# Patient Record
Sex: MALE | Race: WHITE | NOT HISPANIC OR LATINO | Employment: PART TIME | URBAN - METROPOLITAN AREA
[De-identification: names, ages, dates, MRNs, and addresses within clinical notes are randomized per-mention and may not be internally consistent; named-entity substitution may affect disease eponyms.]

---

## 2018-03-19 ENCOUNTER — OFFICE VISIT (OUTPATIENT)
Dept: URGENT CARE | Facility: RETAIL CLINIC | Age: 18
End: 2018-03-19
Payer: COMMERCIAL

## 2018-03-19 VITALS
TEMPERATURE: 98.4 F | SYSTOLIC BLOOD PRESSURE: 102 MMHG | DIASTOLIC BLOOD PRESSURE: 57 MMHG | HEART RATE: 52 BPM | OXYGEN SATURATION: 96 %

## 2018-03-19 DIAGNOSIS — H61.23 BILATERAL IMPACTED CERUMEN: Primary | ICD-10-CM

## 2018-03-19 DIAGNOSIS — H93.8X3 EAR FULLNESS, BILATERAL: ICD-10-CM

## 2018-03-19 PROCEDURE — 69209 REMOVE IMPACTED EAR WAX UNI: CPT | Mod: 50 | Performed by: NURSE PRACTITIONER

## 2018-03-19 PROCEDURE — 99213 OFFICE O/P EST LOW 20 MIN: CPT | Mod: 25 | Performed by: NURSE PRACTITIONER

## 2018-03-19 NOTE — MR AVS SNAPSHOT
"              After Visit Summary   3/19/2018    Jaret Montoya    MRN: [id number]           Patient Information     Date Of Birth          2000        Visit Information        Provider Department      3/19/2018 10:10 AM Guzman Burnette APRN [facility]        Today's Diagnoses     Bilateral impacted cerumen    -  1    Ear fullness, bilateral           Follow-ups after your visit        Who to contact     You can reach your care team any time of the day by calling [phone].  Notification of test results:  If you have an abnormal lab result, we will notify you by phone as soon as possible.         Additional Information About Your Visit        MyChart Information     [employer] lets you send messages to your doctor, view your test results, renew your prescriptions, schedule appointments and more. To sign up, go to [email or web address] . Click on \"Log in\" on the left side of the screen, which will take you to the Welcome page. Then click on \"Sign up Now\" on the right side of the page.     You will be asked to enter the access code listed below, as well as some personal information. Please follow the directions to create your username and password.     Your access code is: [email or web address]  Expires: 2018 11:12 AM     Your access code will  in 90 days. If you need help or a new code, please call your [facility] clinic or [phone].        Care EveryWhere ID     This is your [facility] EveryWhere ID. This could be used by other organizations to access your [facility] medical records  [id number]        Your Vitals Were     Pulse Temperature Pulse Oximetry             52 98.4  F (36.9  C) (Oral) 96%          Blood Pressure from Last 3 Encounters:   18 102/57   03/10/15 110/64   09/15/14 104/64    Weight from Last 3 Encounters:   11/17/15 124 lb (56.2 kg) (13 %)*   03/10/15 116 lb 3.2 oz (52.7 kg) (9 %)*   09/15/14 112 lb 9.6 oz (51.1 kg) (9 %)*     * Growth percentiles are based " on CDC 2-20 Years data.              We Performed the Following     HC REMOVAL IMPACTED CERUMEN IRRIGATION/LVG UNILAT        Primary Care Provider Office Phone # Fax #    Henri Saldivar PA-C [phone]       [place] [address]        Equal Access to Services     JABARIFIDELINA JANETH : Hadii aad ku hadasho Soomaali, waaxda luqadaha, qaybta kaalmada adeegyada, waxay idiin hayaan adeeg kharash la'aan . So [facility] [phone].    ATENCIÓN: Si habla español, tiene a manriquez disposición servicios gratuitos de asistencia lingüística. Llame al [phone].    We comply with applicable federal civil rights laws and [place] laws. We do not discriminate on the basis of race, color, national origin, age, disability, sex, sexual orientation, or gender identity.            Thank you!     Thank you for choosing [facility]  for your care. Our goal is always to provide you with excellent care. Hearing back from our patients is one way we can continue to improve our services. Please take a few minutes to complete the written survey that you may receive in the mail after your visit with us. Thank you!             Your Updated Medication List - Protect others around you: Learn how to safely use, store and throw away your medicines at www.disposemymeds.org.      Notice  As of 3/19/2018 11:12 AM    You have not been prescribed any medications.

## 2018-03-19 NOTE — NURSING NOTE
"Chief Complaint   Patient presents with     Ear Problem     both ears x a couple of weeks       Initial /57  Pulse 52  Temp 98.4  F (36.9  C) (Oral)  SpO2 96% Estimated body mass index is 19.71 kg/(m^2) as calculated from the following:    Height as of 3/10/15: 5' 6.5\" (1.689 m).    Weight as of 11/17/15: 124 lb (56.2 kg).  Medication Reconciliation: complete   Zaida Reynolds      "

## 2018-03-19 NOTE — PROGRESS NOTES
SUBJECTIVE:  Jaret Montoya is a 18 year old male who presents with bilateral ear fullness and pressure for 2 week(s).   Severity: mild and moderate   Timing:still present  Additional symptoms include nasal congestion, cough, ear pain, fever, rhinorrhea, sore throat and post nasal drip.      History of recurrent otitis: yes    Past Medical History:   Diagnosis Date     ALLERGIC RHINITIS NOS 7/21/2007     ASTHMA - MILD INTERMITTENT 7/21/2007     ATTN DEFICIT W HYPERACT 6/21/2007     Head contusion 1/7/2009     Impacted cerumen 5/21/2014     Plantar warts 8/27/2009     No current outpatient prescriptions on file.     History   Smoking Status     Never Smoker   Smokeless Tobacco     Never Used     Comment: father smokes in house     ROS:   Review of systems negative except as stated above.    OBJECTIVE:  /57  Pulse 52  Temp 98.4  F (36.9  C) (Oral)  SpO2 96%  The right TM is normal: no effusions, no erythema, and normal landmarks     The right auditory canal is obstructed with cerumen  The left TM is normal: no effusions, no erythema, and normal landmarks  The left auditory canal is obstructed with cerumen  Oropharynx exam is normal: no lesions, erythema, adenopathy or exudate.  GENERAL: alert and mild distress  EYES: EOMI,  PERRL, conjunctiva clear  NECK: supple, non-tender to palpation, no adenopathy noted  RESP: lungs clear to auscultation - no rales, rhonchi or wheezes  CV: regular rates and rhythm, normal S1 S2, no murmur noted  ABDOMEN: soft, normal bowel sounds  SKIN: no suspicious lesions or rashes     ASSESSMENT:   Ear fullness, bilateral  Bilateral impacted cerumen      PLAN:  CPT code 51230  MA Flushed both ears with warm water/hydrogen peroxide mix, impacted wax removed,  after ear lavage no remainder of cerumen left in ear canals clear.    Discussed care & treatment of cerumen impaction.  Reviewed different OTC/Home care options available (oil drops, candeling, Debrox, etc.)  Advised Jaret VELÁSQUEZ  Jan to f/u with PCP if persistent problems with ears or hearing.    Guzman Burnette MSN, APRN, Family NP-C  [facility]

## 2020-07-25 ENCOUNTER — HOSPITAL ENCOUNTER (EMERGENCY)
Facility: CLINIC | Age: 20
Discharge: HOME | End: 2020-07-25
Attending: FAMILY MEDICINE | Admitting: FAMILY MEDICINE
Payer: COMMERCIAL

## 2020-07-25 VITALS
TEMPERATURE: 98.2 F | OXYGEN SATURATION: 98 % | RESPIRATION RATE: 16 BRPM | BODY MASS INDEX: 26.38 KG/M2 | DIASTOLIC BLOOD PRESSURE: 68 MMHG | SYSTOLIC BLOOD PRESSURE: 141 MMHG | HEART RATE: 85 BPM | WEIGHT: 165.9 LBS

## 2020-07-25 DIAGNOSIS — K04.7 DENTAL ABSCESS: ICD-10-CM

## 2020-07-25 PROCEDURE — 99283 EMERGENCY DEPT VISIT LOW MDM: CPT | Performed by: FAMILY MEDICINE

## 2020-07-25 PROCEDURE — 99283 EMERGENCY DEPT VISIT LOW MDM: CPT | Mod: Z6 | Performed by: FAMILY MEDICINE

## 2020-07-25 RX ORDER — CLINDAMYCIN HCL 300 MG
300 CAPSULE ORAL 4 TIMES DAILY
Qty: 40 CAPSULE | Refills: 0 | Status: SHIPPED | OUTPATIENT
Start: 2020-07-25 | End: 2020-08-04

## 2020-07-25 NOTE — ED TRIAGE NOTES
Patient has abscessed areas to top and bottom gums on the right side of mouth. Scheduled to have teeth pulled in about a month.

## 2020-07-25 NOTE — ED PROVIDER NOTES
History     Chief Complaint   Patient presents with     Dental Pain     HPI  Jaret Montoya is a 20 year old male who presents with swelling Around his upper and lower teeth area.  He just noticed this yesterday.  Patient has been seen the dentist and is set up to have his teeth removed but not for another month.  He just finished a course of antibiotics about 2 days ago, it was amoxicillin.  Patient states since yesterday he has had some increasing pain in his mouth.  It hurts when he chews.  Denies any fevers or chills.  There is been no nausea, vomiting or diarrhea.  Pain is described as aching, nothing makes it better or worse.    Allergies:  Allergies   Allergen Reactions     Azithromycin Rash     Zithromax     Dust Mites        Problem List:    Patient Active Problem List    Diagnosis Date Noted     Impacted cerumen 05/21/2014     Priority: Medium     History of impacted cerumen 04/30/2014     Priority: Medium     Plantar warts 08/27/2009     Priority: Medium     Head contusion 01/07/2009     Priority: Medium     Allergic rhinitis 07/21/2007     Priority: Medium     Problem list name updated by automated process. Provider to review       Mild intermittent asthma 07/21/2007     Priority: Medium     Attention deficit hyperactivity disorder (ADHD) 06/21/2007     Priority: Medium     Problem list name updated by automated process. Provider to review          Past Medical History:    Past Medical History:   Diagnosis Date     ALLERGIC RHINITIS NOS 7/21/2007     ASTHMA - MILD INTERMITTENT 7/21/2007     ATTN DEFICIT W HYPERACT 6/21/2007     Head contusion 1/7/2009     Impacted cerumen 5/21/2014     Plantar warts 8/27/2009       Past Surgical History:    No past surgical history on file.    Family History:    No family history on file.    Social History:  Marital Status:  Single [1]  Social History     Tobacco Use     Smoking status: Never Smoker     Smokeless tobacco: Never Used     Tobacco comment: father smokes  in house   Substance Use Topics     Alcohol use: No     Alcohol/week: 0.0 standard drinks     Drug use: No        Medications:    No current outpatient medications on file.        Review of Systems   All other systems reviewed and are negative.      Physical Exam   BP: (!) 141/68  Pulse: 85  Temp: 98.2  F (36.8  C)  Resp: 16  Weight: 75.3 kg (165 lb 14.4 oz)  SpO2: 98 %      Physical Exam  Vitals signs and nursing note reviewed.   Constitutional:       General: He is not in acute distress.     Appearance: Normal appearance. He is not ill-appearing.   HENT:      Mouth/Throat:      Dentition: Abnormal dentition. Gingival swelling, dental caries and dental abscesses present. No dental tenderness.     Neurological:      Mental Status: He is alert.         ED Course        Procedures               No results found for this or any previous visit (from the past 24 hour(s)).    Medications - No data to display     6 exam is consistent with a dental abscess that is developing.  Recommend patient to follow-up with a dentist as soon as possible.  We will start the patient on another round of antibiotics, I will choose clindamycin to see if this works better.  I strongly though suggested the patient see his dentist as soon as possible.  Patient discharged home.    Assessments & Plan (with Medical Decision Making)  Dental abscess     I have reviewed the nursing notes.    I have reviewed the findings, diagnosis, plan and need for follow up with the patient.              7/25/2020   [facility] EMERGENCY DEPARTMENT     Melvin Crawford MD  07/25/20 [id number]

## 2020-07-25 NOTE — ED AVS SNAPSHOT
[facility] Emergency Department  [address] [place] [address]  Phone:  [phone]  Fax:  [phone]                                    Jaret Montoya   MRN: [id number]    Department:  [facility] Emergency Department   Date of Visit:  7/25/2020           After Visit Summary Signature Page    I have received my discharge instructions, and my questions have been answered. I have discussed any challenges I see with this plan with the nurse or doctor.    ..........................................................................................................................................  Patient/Patient Representative Signature      ..........................................................................................................................................  Patient Representative Print Name and Relationship to Patient    ..................................................               ................................................  Date                                   Time    ..........................................................................................................................................  Reviewed by Signature/Title    ...................................................              ..............................................  Date                                               Time          22EPIC Rev 08/18

## 2022-09-21 ENCOUNTER — HOSPITAL ENCOUNTER (EMERGENCY)
Facility: CLINIC | Age: 22
Discharge: HOME | End: 2022-09-21
Attending: FAMILY MEDICINE | Admitting: FAMILY MEDICINE
Payer: COMMERCIAL

## 2022-09-21 VITALS
SYSTOLIC BLOOD PRESSURE: 134 MMHG | WEIGHT: 176.5 LBS | DIASTOLIC BLOOD PRESSURE: 70 MMHG | OXYGEN SATURATION: 97 % | RESPIRATION RATE: 23 BRPM | HEART RATE: 111 BPM | BODY MASS INDEX: 28.06 KG/M2 | TEMPERATURE: 99 F

## 2022-09-21 DIAGNOSIS — U07.1 INFECTION DUE TO 2019 NOVEL CORONAVIRUS: ICD-10-CM

## 2022-09-21 LAB
ALBUMIN SERPL-MCNC: 4.8 G/DL (ref 3.4–5)
ALBUMIN UR-MCNC: ABNORMAL MG/DL
ALP SERPL-CCNC: 62 U/L (ref 40–150)
ALT SERPL W P-5'-P-CCNC: 53 U/L (ref 0–70)
ANION GAP SERPL CALCULATED.3IONS-SCNC: 7 MMOL/L (ref 3–14)
APPEARANCE UR: CLEAR
AST SERPL W P-5'-P-CCNC: 23 U/L (ref 0–45)
BASOPHILS # BLD AUTO: 0 10E3/UL (ref 0–0.2)
BASOPHILS NFR BLD AUTO: 0 %
BILIRUB SERPL-MCNC: 0.8 MG/DL (ref 0.2–1.3)
BILIRUB UR QL STRIP: NEGATIVE
BUN SERPL-MCNC: 11 MG/DL (ref 7–30)
CALCIUM SERPL-MCNC: 9.4 MG/DL (ref 8.5–10.1)
CHLORIDE BLD-SCNC: 104 MMOL/L (ref 94–109)
CO2 SERPL-SCNC: 28 MMOL/L (ref 20–32)
COLOR UR AUTO: YELLOW
CREAT SERPL-MCNC: 0.93 MG/DL (ref 0.66–1.25)
EOSINOPHIL # BLD AUTO: 0.3 10E3/UL (ref 0–0.7)
EOSINOPHIL NFR BLD AUTO: 5 %
ERYTHROCYTE [DISTWIDTH] IN BLOOD BY AUTOMATED COUNT: 11.7 % (ref 10–15)
FLUAV RNA SPEC QL NAA+PROBE: NEGATIVE
FLUBV RNA RESP QL NAA+PROBE: NEGATIVE
GFR SERPL CREATININE-BSD FRML MDRD: >90 ML/MIN/1.73M2
GLUCOSE BLD-MCNC: 96 MG/DL (ref 70–99)
GLUCOSE UR STRIP-MCNC: NEGATIVE MG/DL
HCT VFR BLD AUTO: 44.4 % (ref 40–53)
HGB BLD-MCNC: 16.1 G/DL (ref 13.3–17.7)
HGB UR QL STRIP: NEGATIVE
IMM GRANULOCYTES # BLD: 0 10E3/UL
IMM GRANULOCYTES NFR BLD: 0 %
KETONES UR STRIP-MCNC: 5 MG/DL
LACTATE SERPL-SCNC: 1.2 MMOL/L (ref 0.7–2)
LEUKOCYTE ESTERASE UR QL STRIP: NEGATIVE
LIPASE SERPL-CCNC: 154 U/L (ref 73–393)
LYMPHOCYTES # BLD AUTO: 0.5 10E3/UL (ref 0.8–5.3)
LYMPHOCYTES NFR BLD AUTO: 7 %
MCH RBC QN AUTO: 31.6 PG (ref 26.5–33)
MCHC RBC AUTO-ENTMCNC: 36.3 G/DL (ref 31.5–36.5)
MCV RBC AUTO: 87 FL (ref 78–100)
MONOCYTES # BLD AUTO: 0.8 10E3/UL (ref 0–1.3)
MONOCYTES NFR BLD AUTO: 11 %
NEUTROPHILS # BLD AUTO: 5.3 10E3/UL (ref 1.6–8.3)
NEUTROPHILS NFR BLD AUTO: 77 %
NITRATE UR QL: NEGATIVE
NRBC # BLD AUTO: 0 10E3/UL
NRBC BLD AUTO-RTO: 0 /100
PH UR STRIP: 7.5 [PH] (ref 5–7)
PLATELET # BLD AUTO: 210 10E3/UL (ref 150–450)
POTASSIUM BLD-SCNC: 3.3 MMOL/L (ref 3.4–5.3)
PROT SERPL-MCNC: 7.9 G/DL (ref 6.8–8.8)
RBC # BLD AUTO: 5.1 10E6/UL (ref 4.4–5.9)
RBC URINE: 0 /HPF
SARS-COV-2 RNA RESP QL NAA+PROBE: POSITIVE
SODIUM SERPL-SCNC: 139 MMOL/L (ref 133–144)
SP GR UR STRIP: 1 (ref 1–1.03)
UROBILINOGEN UR STRIP-MCNC: NORMAL MG/DL
WBC # BLD AUTO: 6.9 10E3/UL (ref 4–11)
WBC URINE: <1 /HPF

## 2022-09-21 PROCEDURE — 85004 AUTOMATED DIFF WBC COUNT: CPT | Performed by: FAMILY MEDICINE

## 2022-09-21 PROCEDURE — C9803 HOPD COVID-19 SPEC COLLECT: HCPCS | Performed by: FAMILY MEDICINE

## 2022-09-21 PROCEDURE — 83605 ASSAY OF LACTIC ACID: CPT | Performed by: FAMILY MEDICINE

## 2022-09-21 PROCEDURE — 87636 SARSCOV2 & INF A&B AMP PRB: CPT | Performed by: FAMILY MEDICINE

## 2022-09-21 PROCEDURE — 83690 ASSAY OF LIPASE: CPT | Performed by: FAMILY MEDICINE

## 2022-09-21 PROCEDURE — 99284 EMERGENCY DEPT VISIT MOD MDM: CPT | Mod: CS | Performed by: FAMILY MEDICINE

## 2022-09-21 PROCEDURE — 96375 TX/PRO/DX INJ NEW DRUG ADDON: CPT | Performed by: FAMILY MEDICINE

## 2022-09-21 PROCEDURE — 96374 THER/PROPH/DIAG INJ IV PUSH: CPT | Performed by: FAMILY MEDICINE

## 2022-09-21 PROCEDURE — 36415 COLL VENOUS BLD VENIPUNCTURE: CPT | Performed by: FAMILY MEDICINE

## 2022-09-21 PROCEDURE — 80053 COMPREHEN METABOLIC PANEL: CPT | Performed by: FAMILY MEDICINE

## 2022-09-21 PROCEDURE — 250N000013 HC RX MED GY IP 250 OP 250 PS 637: Performed by: FAMILY MEDICINE

## 2022-09-21 PROCEDURE — 258N000003 HC RX IP 258 OP 636: Performed by: FAMILY MEDICINE

## 2022-09-21 PROCEDURE — 99284 EMERGENCY DEPT VISIT MOD MDM: CPT | Mod: CS,25 | Performed by: FAMILY MEDICINE

## 2022-09-21 PROCEDURE — 250N000011 HC RX IP 250 OP 636: Performed by: FAMILY MEDICINE

## 2022-09-21 PROCEDURE — 81001 URINALYSIS AUTO W/SCOPE: CPT | Performed by: FAMILY MEDICINE

## 2022-09-21 PROCEDURE — 96361 HYDRATE IV INFUSION ADD-ON: CPT | Performed by: FAMILY MEDICINE

## 2022-09-21 RX ORDER — ONDANSETRON 2 MG/ML
4 INJECTION INTRAMUSCULAR; INTRAVENOUS EVERY 30 MIN PRN
Status: DISCONTINUED | OUTPATIENT
Start: 2022-09-21 | End: 2022-09-21 | Stop reason: HOSPADM

## 2022-09-21 RX ORDER — ACETAMINOPHEN 325 MG/1
975 TABLET ORAL ONCE
Status: COMPLETED | OUTPATIENT
Start: 2022-09-21 | End: 2022-09-21

## 2022-09-21 RX ORDER — ONDANSETRON 4 MG/1
4 TABLET, ORALLY DISINTEGRATING ORAL EVERY 8 HOURS PRN
Qty: 10 TABLET | Refills: 0 | Status: SHIPPED | OUTPATIENT
Start: 2022-09-21 | End: 2022-09-24

## 2022-09-21 RX ORDER — KETOROLAC TROMETHAMINE 30 MG/ML
30 INJECTION, SOLUTION INTRAMUSCULAR; INTRAVENOUS ONCE
Status: COMPLETED | OUTPATIENT
Start: 2022-09-21 | End: 2022-09-21

## 2022-09-21 RX ORDER — SODIUM CHLORIDE 9 MG/ML
INJECTION, SOLUTION INTRAVENOUS CONTINUOUS
Status: DISCONTINUED | OUTPATIENT
Start: 2022-09-21 | End: 2022-09-21 | Stop reason: HOSPADM

## 2022-09-21 RX ADMIN — ACETAMINOPHEN 975 MG: 325 TABLET, FILM COATED ORAL at 16:46

## 2022-09-21 RX ADMIN — ONDANSETRON 4 MG: 2 INJECTION INTRAMUSCULAR; INTRAVENOUS at 16:05

## 2022-09-21 RX ADMIN — KETOROLAC TROMETHAMINE 30 MG: 30 INJECTION, SOLUTION INTRAMUSCULAR; INTRAVENOUS at 16:05

## 2022-09-21 RX ADMIN — SODIUM CHLORIDE 1000 ML: 9 INJECTION, SOLUTION INTRAVENOUS at 16:04

## 2022-09-21 ASSESSMENT — ACTIVITIES OF DAILY LIVING (ADL): ADLS_ACUITY_SCORE: 35

## 2022-09-21 NOTE — ED PROVIDER NOTES
History     Chief Complaint   Patient presents with     Back Pain     HPI  Jaret Montoya is a 22 year old male who presents with back pain that started a few hours ago.  Patient states that the pain is in his mid back area and does not radiate.  Patient states the pain is constant.  Nothing he can do makes the pain better or worse.  Denies any vomiting but has had a lot of nausea.  Patient had a bowel movement today which was normal.  Denies any dysuria or hematuria.  Patient denies any recent trauma.  Patient was not doing any strenuous activity when the pain started.    Allergies:  Allergies   Allergen Reactions     Azithromycin Rash     Zithromax     Dust Mites        Problem List:    Patient Active Problem List    Diagnosis Date Noted     Impacted cerumen 05/21/2014     Priority: Medium     History of impacted cerumen 04/30/2014     Priority: Medium     Plantar warts 08/27/2009     Priority: Medium     Head contusion 01/07/2009     Priority: Medium     Allergic rhinitis 07/21/2007     Priority: Medium     Problem list name updated by automated process. Provider to review       Mild intermittent asthma 07/21/2007     Priority: Medium     Attention deficit hyperactivity disorder (ADHD) 06/21/2007     Priority: Medium     Problem list name updated by automated process. Provider to review          Past Medical History:    Past Medical History:   Diagnosis Date     ALLERGIC RHINITIS NOS 7/21/2007     ASTHMA - MILD INTERMITTENT 7/21/2007     ATTN DEFICIT W HYPERACT 6/21/2007     Head contusion 1/7/2009     Impacted cerumen 5/21/2014     Plantar warts 8/27/2009       Past Surgical History:    No past surgical history on file.    Family History:    No family history on file.    Social History:  Marital Status:  Single [1]  Social History     Tobacco Use     Smoking status: Never Smoker     Smokeless tobacco: Never Used     Tobacco comment: father smokes in house   Substance Use Topics     Alcohol use: No      Alcohol/week: 0.0 standard drinks     Drug use: No        Medications:    No current outpatient medications on file.        Review of Systems   All other systems reviewed and are negative.      Physical Exam   BP: 134/89  Pulse: (!) 121  Temp: (!) 101.5  F (38.6  C)  Resp: 18  Weight: 80.1 kg (176 lb 8 oz)  SpO2: 100 %      Physical Exam  Vitals and nursing note reviewed.   Constitutional:       General: He is not in acute distress.     Appearance: He is well-developed. He is not diaphoretic.   HENT:      Head: Normocephalic and atraumatic.      Nose: Nose normal.      Mouth/Throat:      Pharynx: No oropharyngeal exudate.   Eyes:      General: No scleral icterus.     Conjunctiva/sclera: Conjunctivae normal.      Pupils: Pupils are equal, round, and reactive to light.   Cardiovascular:      Rate and Rhythm: Normal rate and regular rhythm.      Heart sounds: Normal heart sounds. No murmur heard.    No friction rub.   Pulmonary:      Effort: Pulmonary effort is normal. No respiratory distress.      Breath sounds: Normal breath sounds. No wheezing or rales.   Abdominal:      General: Bowel sounds are normal. There is no distension.      Palpations: Abdomen is soft. There is no mass.      Tenderness: There is no abdominal tenderness. There is no guarding or rebound.   Musculoskeletal:         General: Tenderness (mild mid back tenderness) present. Normal range of motion.      Cervical back: Normal range of motion.   Skin:     General: Skin is warm.      Findings: No rash.   Neurological:      Mental Status: He is alert and oriented to person, place, and time.   Psychiatric:         Judgment: Judgment normal.         ED Course                 Procedures           Results for orders placed or performed during the hospital encounter of 09/21/22 (from the past 24 hour(s))   Comprehensive metabolic panel   Result Value Ref Range    Sodium 139 133 - 144 mmol/L    Potassium 3.3 (L) 3.4 - 5.3 mmol/L    Chloride 104 94 - 109  mmol/L    Carbon Dioxide (CO2) 28 20 - 32 mmol/L    Anion Gap 7 3 - 14 mmol/L    Urea Nitrogen 11 7 - 30 mg/dL    Creatinine 0.93 0.66 - 1.25 mg/dL    Calcium 9.4 8.5 - 10.1 mg/dL    Glucose 96 70 - 99 mg/dL    Alkaline Phosphatase 62 40 - 150 U/L    AST 23 0 - 45 U/L    ALT 53 0 - 70 U/L    Protein Total 7.9 6.8 - 8.8 g/dL    Albumin 4.8 3.4 - 5.0 g/dL    Bilirubin Total 0.8 0.2 - 1.3 mg/dL    GFR Estimate >90 >60 mL/min/1.73m2   CBC with platelets differential    Narrative    The following orders were created for panel order CBC with platelets differential.  Procedure                               Abnormality         Status                     ---------                               -----------         ------                     CBC with platelets and d...[[id number]]  Abnormal            Final result                 Please view results for these tests on the individual orders.   Lipase   Result Value Ref Range    Lipase 154 73 - 393 U/L   Lactic acid whole blood   Result Value Ref Range    Lactic Acid 1.2 0.7 - 2.0 mmol/L   CBC with platelets and differential   Result Value Ref Range    WBC Count 6.9 4.0 - 11.0 10e3/uL    RBC Count 5.10 4.40 - 5.90 10e6/uL    Hemoglobin 16.1 13.3 - 17.7 g/dL    Hematocrit 44.4 40.0 - 53.0 %    MCV 87 78 - 100 fL    MCH 31.6 26.5 - 33.0 pg    MCHC 36.3 31.5 - 36.5 g/dL    RDW 11.7 10.0 - 15.0 %    Platelet Count 210 150 - 450 10e3/uL    % Neutrophils 77 %    % Lymphocytes 7 %    % Monocytes 11 %    % Eosinophils 5 %    % Basophils 0 %    % Immature Granulocytes 0 %    NRBCs per 100 WBC 0 <1 /100    Absolute Neutrophils 5.3 1.6 - 8.3 10e3/uL    Absolute Lymphocytes 0.5 (L) 0.8 - 5.3 10e3/uL    Absolute Monocytes 0.8 0.0 - 1.3 10e3/uL    Absolute Eosinophils 0.3 0.0 - 0.7 10e3/uL    Absolute Basophils 0.0 0.0 - 0.2 10e3/uL    Absolute Immature Granulocytes 0.0 <=0.4 10e3/uL    Absolute NRBCs 0.0 10e3/uL   UA with Microscopic reflex to Culture    Specimen: Urine, Midstream   Result  Value Ref Range    Color Urine Yellow Colorless, Straw, Light Yellow, Yellow    Appearance Urine Clear Clear    Glucose Urine Negative Negative mg/dL    Bilirubin Urine Negative Negative    Ketones Urine 5  (A) Negative mg/dL    Specific Gravity Urine 1.005 1.003 - 1.035    Blood Urine Negative Negative    pH Urine 7.5 (H) 5.0 - 7.0    Protein Albumin Urine Trace (A) Negative mg/dL    Urobilinogen Urine Normal Normal, 2.0 mg/dL    Nitrite Urine Negative Negative    Leukocyte Esterase Urine Negative Negative    RBC Urine 0 <=2 /HPF    WBC Urine <1 <=5 /HPF    Narrative    Urine Culture not indicated   Symptomatic; Auto-generated order Influenza A/B & SARS-CoV2 (COVID-19) Virus PCR Multiplex Nose    Specimen: Nose; Swab   Result Value Ref Range    Influenza A PCR Negative Negative    Influenza B PCR Negative Negative    SARS CoV2 PCR Positive (A) Negative    Narrative    Testing was performed using the carlos SARS-CoV-2 & Influenza A/B Assay on the carlos [employer] System. This test should be ordered for the detection of SARS-CoV-2 and influenza viruses in individuals who meet clinical and/or epidemiological criteria. Test performance is unknown in asymptomatic patients. This test is for in vitro diagnostic use under the FDA EUA for laboratories certified under CLIA to perform moderate and/or high complexity testing. This test has not been FDA cleared or approved. A negative result does not rule out the presence of PCR inhibitors in the specimen or target RNA in concentration below the limit of detection for the assay. If only one viral target is positive but coinfection with multiple targets is suspected, the sample should be re-tested with another FDA cleared, approved or authorized test, if coinfection would change clinical management. [facility] are certified under the Clinical Laboratory Improvement Amendments of 1988 (CLIA-88) as  qualified to perform moderate and/or high complexity laboratory  testing.       Medications   sodium chloride (PF) 0.9% PF flush 3 mL (has no administration in time range)   sodium chloride (PF) 0.9% PF flush 3 mL (has no administration in time range)   0.9% sodium chloride BOLUS (has no administration in time range)     Followed by   sodium chloride 0.9% infusion (has no administration in time range)   ondansetron (ZOFRAN) injection 4 mg (has no administration in time range)   acetaminophen (TYLENOL) tablet 975 mg (has no administration in time range)   ketorolac (TORADOL) injection 30 mg (has no administration in time range)     Labs have come back and patient does not have any signs of sepsis.  Patient's COVID test did come back and was positive.  I think that the back pain is more muscular, I do not suspect any epidural or spinal abscess.  Patient is feeling significantly better now.  We will go ahead and discharge the patient home.  Recommend conservative care.  Patient was given a note to be off work for the next 5 days.  Patient will follow-up if there is no improvement over the next few days.    Assessments & Plan (with Medical Decision Making)  COVID-19 infection     I have reviewed the nursing notes.    I have reviewed the findings, diagnosis, plan and need for follow up with the patient.              9/21/2022   [facility] EMERGENCY DEPT     Melvin Crawford MD  09/21/22 [id number]

## 2022-09-21 NOTE — ED TRIAGE NOTES
He has mid back pain that started this afternoon at work but is not sure of an injury and denies urinary symptoms.

## 2022-09-21 NOTE — Clinical Note
Jaret Montoya was seen and treated in our emergency department on 9/21/2022.  He may return to work on 09/27/2022.  Patient needs to be excused from work for this period of time due to being COVID positive.     If you have any questions or concerns, please don't hesitate to call.      Melvin Crawford MD

## 2022-11-06 ENCOUNTER — HOSPITAL ENCOUNTER (EMERGENCY)
Facility: CLINIC | Age: 22
Discharge: HOME | End: 2022-11-06
Attending: EMERGENCY MEDICINE | Admitting: EMERGENCY MEDICINE
Payer: OTHER MISCELLANEOUS

## 2022-11-06 VITALS
OXYGEN SATURATION: 99 % | BODY MASS INDEX: 27.04 KG/M2 | TEMPERATURE: 98 F | WEIGHT: 178.4 LBS | HEIGHT: 68 IN | HEART RATE: 76 BPM | DIASTOLIC BLOOD PRESSURE: 83 MMHG | RESPIRATION RATE: 20 BRPM | SYSTOLIC BLOOD PRESSURE: 139 MMHG

## 2022-11-06 DIAGNOSIS — S01.81XA LACERATION OF FOREHEAD, INITIAL ENCOUNTER: ICD-10-CM

## 2022-11-06 PROCEDURE — 99284 EMERGENCY DEPT VISIT MOD MDM: CPT | Mod: 25 | Performed by: EMERGENCY MEDICINE

## 2022-11-06 PROCEDURE — 90715 TDAP VACCINE 7 YRS/> IM: CPT | Performed by: EMERGENCY MEDICINE

## 2022-11-06 PROCEDURE — 90471 IMMUNIZATION ADMIN: CPT | Performed by: EMERGENCY MEDICINE

## 2022-11-06 PROCEDURE — 250N000011 HC RX IP 250 OP 636: Performed by: EMERGENCY MEDICINE

## 2022-11-06 PROCEDURE — 12013 RPR F/E/E/N/L/M 2.6-5.0 CM: CPT | Performed by: EMERGENCY MEDICINE

## 2022-11-06 PROCEDURE — 99283 EMERGENCY DEPT VISIT LOW MDM: CPT | Mod: 25 | Performed by: EMERGENCY MEDICINE

## 2022-11-06 RX ORDER — ONDANSETRON 4 MG/1
4 TABLET, ORALLY DISINTEGRATING ORAL ONCE
Status: COMPLETED | OUTPATIENT
Start: 2022-11-06 | End: 2022-11-06

## 2022-11-06 RX ADMIN — CLOSTRIDIUM TETANI TOXOID ANTIGEN (FORMALDEHYDE INACTIVATED), CORYNEBACTERIUM DIPHTHERIAE TOXOID ANTIGEN (FORMALDEHYDE INACTIVATED), BORDETELLA PERTUSSIS TOXOID ANTIGEN (GLUTARALDEHYDE INACTIVATED), BORDETELLA PERTUSSIS FILAMENTOUS HEMAGGLUTININ ANTIGEN (FORMALDEHYDE INACTIVATED), BORDETELLA PERTUSSIS PERTACTIN ANTIGEN, AND BORDETELLA PERTUSSIS FIMBRIAE 2/3 ANTIGEN 0.5 ML: 5; 2; 2.5; 5; 3; 5 INJECTION, SUSPENSION INTRAMUSCULAR at 15:55

## 2022-11-06 RX ADMIN — ONDANSETRON 4 MG: 4 TABLET, ORALLY DISINTEGRATING ORAL at 15:54

## 2022-11-06 ASSESSMENT — ACTIVITIES OF DAILY LIVING (ADL): ADLS_ACUITY_SCORE: 35

## 2022-11-06 NOTE — ED PROVIDER NOTES
History     Chief Complaint   Patient presents with     Head Laceration     HPI  Jaret Montoya is a 22 year old male who presents with a right forehead laceration.  This occurred just prior to arrival at home when he stood up and hit it on a metal support.  No loss of consciousness.  He was somewhat dazed and nauseous.  No other injury.    Allergies:  Allergies   Allergen Reactions     Azithromycin Rash     Zithromax     Dust Mites        Problem List:    Patient Active Problem List    Diagnosis Date Noted     Impacted cerumen 05/21/2014     Priority: Medium     History of impacted cerumen 04/30/2014     Priority: Medium     Plantar warts 08/27/2009     Priority: Medium     Head contusion 01/07/2009     Priority: Medium     Allergic rhinitis 07/21/2007     Priority: Medium     Problem list name updated by automated process. Provider to review       Mild intermittent asthma 07/21/2007     Priority: Medium     Attention deficit hyperactivity disorder (ADHD) 06/21/2007     Priority: Medium     Problem list name updated by automated process. Provider to review          Past Medical History:    Past Medical History:   Diagnosis Date     ALLERGIC RHINITIS NOS 7/21/2007     ASTHMA - MILD INTERMITTENT 7/21/2007     ATTN DEFICIT W HYPERACT 6/21/2007     Head contusion 1/7/2009     Impacted cerumen 5/21/2014     Plantar warts 8/27/2009       Past Surgical History:    History reviewed. No pertinent surgical history.    Family History:    History reviewed. No pertinent family history.    Social History:  Marital Status:  Single [1]  Social History     Tobacco Use     Smoking status: Never     Smokeless tobacco: Never     Tobacco comments:     father smokes in house   Substance Use Topics     Alcohol use: No     Alcohol/week: 0.0 standard drinks     Drug use: No        Medications:    No current outpatient medications on file.        Review of Systems  All other systems are reviewed and are negative    Physical Exam   BP:  "139/83  Pulse: 76  Temp: 98  F (36.7  C)  Resp: 20  Height: 172.7 cm (5' 8\")  Weight: 80.9 kg (178 lb 6.4 oz)  SpO2: 99 %      Physical Exam  Vitals reviewed.   Constitutional:       General: He is not in acute distress.     Appearance: He is not diaphoretic.   HENT:      Head:      Comments: 3 cm right forehead laceration.  No bony step-off or crepitus.  Eyes:      General: No scleral icterus.        Right eye: No discharge.         Left eye: No discharge.      Conjunctiva/sclera: Conjunctivae normal.      Pupils: Pupils are equal, round, and reactive to light.   Pulmonary:      Effort: Pulmonary effort is normal.      Breath sounds: No stridor.   Musculoskeletal:         General: Normal range of motion.      Cervical back: Normal range of motion.   Skin:     General: Skin is warm and dry.      Findings: No rash.   Neurological:      Mental Status: He is alert.      Comments: Normal speech and mentation   Psychiatric:         Judgment: Judgment normal.         ED Course                 [facility]    -Laceration Repair    Date/Time: 11/6/2022 3:47 PM  Performed by: Chino Bee MD  Authorized by: Chino Bee MD     Risks, benefits and alternatives discussed.    LACERATION DETAILS     Location:  Face    Face location:  Forehead    Length (cm):  3    TREATMENT:     Area cleansed with:  Saline    Amount of cleaning:  Standard    SKIN REPAIR     Repair method:  Tissue adhesive    APPROXIMATION     Approximation:  Close    PROCEDURE    Patient Tolerance:  Patient tolerated the procedure well with no immediate complications                Critical Care time:  none               No results found for this or any previous visit (from the past 24 hour(s)).    Medications   ondansetron (ZOFRAN ODT) ODT tab 4 mg (has no administration in time range)       Assessments & Plan (with Medical Decision Making)  22-year-old male with right forehead laceration.  Closed with skin adhesive " without difficulty.     I have reviewed the nursing notes.    I have reviewed the findings, diagnosis, plan and need for follow up with the patient.       New Prescriptions    No medications on file       Final diagnoses:   Laceration of forehead, initial encounter       11/6/2022   [facility] EMERGENCY DEPT     Chino Bee MD  11/06/22 [id number]

## 2022-11-06 NOTE — ED TRIAGE NOTES
Pt reports hitting his head on a metal guard about 1450. Pt denies any LOC but states a LAC to forehead and nausea.      Triage Assessment     Row Name 11/06/22 144       Triage Assessment (Adult)    Airway WDL WDL       Respiratory WDL    Respiratory WDL WDL       Skin Circulation/Temperature WDL    Skin Circulation/Temperature WDL X

## 2024-10-17 ENCOUNTER — HOSPITAL ENCOUNTER (EMERGENCY)
Facility: CLINIC | Age: 24
Discharge: HOME | End: 2024-10-17
Attending: NURSE PRACTITIONER | Admitting: NURSE PRACTITIONER
Payer: MEDICAID

## 2024-10-17 VITALS
HEART RATE: 97 BPM | SYSTOLIC BLOOD PRESSURE: 143 MMHG | RESPIRATION RATE: 16 BRPM | OXYGEN SATURATION: 98 % | TEMPERATURE: 98.6 F | HEIGHT: 68 IN | BODY MASS INDEX: 30.31 KG/M2 | WEIGHT: 200 LBS | DIASTOLIC BLOOD PRESSURE: 86 MMHG

## 2024-10-17 DIAGNOSIS — K22.4 ESOPHAGEAL SPASM: ICD-10-CM

## 2024-10-17 PROCEDURE — 250N000013 HC RX MED GY IP 250 OP 250 PS 637: Performed by: NURSE PRACTITIONER

## 2024-10-17 PROCEDURE — 99283 EMERGENCY DEPT VISIT LOW MDM: CPT | Performed by: NURSE PRACTITIONER

## 2024-10-17 PROCEDURE — 250N000011 HC RX IP 250 OP 636: Performed by: NURSE PRACTITIONER

## 2024-10-17 RX ORDER — ONDANSETRON 4 MG/1
4 TABLET, ORALLY DISINTEGRATING ORAL ONCE
Status: COMPLETED | OUTPATIENT
Start: 2024-10-17 | End: 2024-10-17

## 2024-10-17 RX ORDER — LIDOCAINE HYDROCHLORIDE 20 MG/ML
5 SOLUTION OROPHARYNGEAL ONCE
Status: DISCONTINUED | OUTPATIENT
Start: 2024-10-17 | End: 2024-10-17

## 2024-10-17 RX ADMIN — ONDANSETRON 4 MG: 4 TABLET, ORALLY DISINTEGRATING ORAL at 16:19

## 2024-10-17 RX ADMIN — ANTACID/ANTIFLATULENT 4 G: 380; 550; 10; 10 GRANULE, EFFERVESCENT ORAL at 16:33

## 2024-10-17 ASSESSMENT — ACTIVITIES OF DAILY LIVING (ADL)
ADLS_ACUITY_SCORE: 33
ADLS_ACUITY_SCORE: 35
ADLS_ACUITY_SCORE: 35

## 2024-10-17 ASSESSMENT — COLUMBIA-SUICIDE SEVERITY RATING SCALE - C-SSRS
2. HAVE YOU ACTUALLY HAD ANY THOUGHTS OF KILLING YOURSELF IN THE PAST MONTH?: NO
1. IN THE PAST MONTH, HAVE YOU WISHED YOU WERE DEAD OR WISHED YOU COULD GO TO SLEEP AND NOT WAKE UP?: NO
6. HAVE YOU EVER DONE ANYTHING, STARTED TO DO ANYTHING, OR PREPARED TO DO ANYTHING TO END YOUR LIFE?: NO

## 2024-10-17 NOTE — ED PROVIDER NOTES
ED Provider Note  [facility]      History     Chief Complaint   Patient presents with    Swallowed Foreign Body     Eating chicken at about 14:30     HPI  Jaret Montoya is a 24 year old male who is accompanied by mother today for sensation that he is unable to swallow.  Reports that he was eating chicken Tor today's at approximately 230 and felt that food bolus was stuck in his throat.  He tried drinking fluids which he promptly vomited.  Patient reports that he has never had this happen to him in the past but he does have a history of difficulty swallowing without drinking fluids while he is eating.  Denies any history of GERD or esophageal spasm.  Denies any vomiting of blood, abdominal pain, blood in his stool or dark-colored stools.              Allergies:  Allergies   Allergen Reactions    Azithromycin Rash     Zithromax    Dust Mites        Problem List:    Patient Active Problem List    Diagnosis Date Noted    Impacted cerumen 05/21/2014     Priority: Medium    History of impacted cerumen 04/30/2014     Priority: Medium    Plantar warts 08/27/2009     Priority: Medium    Head contusion 01/07/2009     Priority: Medium    Allergic rhinitis 07/21/2007     Priority: Medium     Problem list name updated by automated process. Provider to review      Mild intermittent asthma 07/21/2007     Priority: Medium    Attention deficit hyperactivity disorder (ADHD) 06/21/2007     Priority: Medium     Problem list name updated by automated process. Provider to review          Past Medical History:    Past Medical History:   Diagnosis Date    ALLERGIC RHINITIS NOS 7/21/2007    ASTHMA - MILD INTERMITTENT 7/21/2007    ATTN DEFICIT W HYPERACT 6/21/2007    Head contusion 1/7/2009    Impacted cerumen 5/21/2014    Plantar warts 8/27/2009       Past Surgical History:    No past surgical history on file.    Family History:    No family history on file.    Social History:  Marital Status:  Single  "[1]  Social History     Tobacco Use    Smoking status: Never    Smokeless tobacco: Never    Tobacco comments:     father smokes in house   Substance Use Topics    Alcohol use: No     Alcohol/week: 0.0 standard drinks of alcohol    Drug use: No        Medications:    No current outpatient medications on file.        Review of Systems  A medically appropriate review of systems was performed with pertinent positives and negatives noted in the HPI, and all other systems negative.    Physical Exam   Patient Vitals for the past 24 hrs:   BP Temp Temp src Pulse Resp SpO2 Height Weight   10/17/24 1535 (!) 143/86 98.6  F (37  C) Oral 97 16 98 % 1.727 m (5' 8\") 90.7 kg (200 lb)          Physical Exam  General: alert and in no acute distress on arrival  Head: atraumatic, normocephalic  ENT:  Nose: No erythema or edema patent nostrils bilateral. Throat: midline uvula, non-erythremic, non-enlarged tonsils..  No cervical adenopathy.   Lungs:  nonlabored, bilateral throughout.  CV: Regular rate and rhythm, extremities warm and perfused  Abd: nondistended, nontender, no RT, no HSM, normal bowel sounds throughout.  Skin: no rashes, no diaphoresis and skin color normal  Neuro: Patient awake, alert, speech is fluent, no focal deficits  Psychiatric: affect/mood normal, appropriate historian      ED Course                 Procedures                    No results found for this or any previous visit (from the past 48 hour(s)).    MEDICATIONS GIVEN IN THE EMERGENCY DEPARTMENT:  Medications   ondansetron (ZOFRAN ODT) ODT tab 4 mg (4 mg Oral $Given 10/17/24 1611)   sod bicarbonate-citric acid-simethicone (EZ GAS) 2.21-1.53-0.04 g packet 4 g (4 g Oral $Given 10/17/24 1633)                Assessments & Plan (with Medical Decision Making)  24 year old male who presents to the Urgent Care for evaluation of sensation of foreign body in his esophagus while eating.  Reports difficulty with eating or drinking afterwards, drink fluids immediately " after this sensation started and vomited these.  Denies any blood in his vomit, no blood in stools, no black or dark-colored stools.  History of GERD, esophageal spasms, has had much milder sensation of having food stuck if he does not drink fluids while he is eating.  Patient was given for nausea and vomiting, then EZ gas reports resolved his symptoms.  Reports that he has got some irritation of his throat which he thinks is from vomiting.  Denies any exposure illnesses to strep throat or other viral illnesses.   Diagnosis: Esophageal spasm  Treatment plan: Patient was given Zofran, EZ gas with improvement of his symptoms.  Patient did not vomit after taking EZ gas feels that he was able to swallow effectively.  Ordered prescription for mouthwash which she can take as needed for esophageal spasm or pain.  Patient advised not to drink or eat hot fluid fluids after taking this.  Recommend follow-up in his primary clinic if symptoms resume or have not resolved.      Patient verbalized agreement with and understanding of the rational for the diagnosis and treatment plan.  All questions were answered to best of my ability and the patient's satisfaction. The patient was advised to contact or return to their primary clinic or UC/ED with any questions that may arise after this visit.       I have reviewed the nursing notes.    I have reviewed the findings, diagnosis, plan and need for follow up with the patient.        NEW PRESCRIPTIONS STARTED AT TODAY'S ER VISIT    New Prescriptions (1)      New  magic mouthwash suspension (diphenhydrAMINE, lidocaine, aluminum-magnesium & simethicone)  Take orally 10 mLs in mouth every 6 hours as needed for mouth sores., Disp-120 mL, R-0, E-Prescribe, Pharmacy: [facility] [address] [place] [address], Refills: 0 ordered, Ordered by Emilie Hinds APRN CNP on 10/17/2024 at 1831          Final diagnoses:   Esophageal spasm       10/17/2024   [facility]  EMERGENCY DEPT    Disclaimer: This note consists of symbols derived from keyboarding, dictation, and/or voice recognition software. As a result, there may be errors in the script that have gone undetected.  Please consider this when interpreting information found in the chart.      Emilie Hinds, CHOLO CNP  10/17/24 2005

## 2024-10-17 NOTE — ED TRIAGE NOTES
Eating chicken about 14:30 today. Now feels  its stuck in his esophagus. No trouble breathing. Currently handling his own spit.

## 2024-10-17 NOTE — DISCHARGE INSTRUCTIONS
Recommend eating smaller bites, chewing frequently and liquids as needed to tolerate swallowing fluids.  You can use the Magic mouthwash as needed 4 times daily if you are having difficulties with esophageal spasming which are symptoms that you are having today.  Symptoms continue recommend follow-up with your primary care office as soon as next week.

## 2024-12-30 ENCOUNTER — APPOINTMENT (OUTPATIENT)
Dept: CT IMAGING | Facility: CLINIC | Age: 24
End: 2024-12-30
Attending: EMERGENCY MEDICINE
Payer: COMMERCIAL

## 2024-12-30 ENCOUNTER — HOSPITAL ENCOUNTER (OUTPATIENT)
Facility: CLINIC | Age: 24
Setting detail: OBSERVATION
Discharge: HOME | End: 2024-12-31
Attending: EMERGENCY MEDICINE | Admitting: STUDENT IN AN ORGANIZED HEALTH CARE EDUCATION/TRAINING PROGRAM
Payer: COMMERCIAL

## 2024-12-30 DIAGNOSIS — K04.7 PERIAPICAL ABSCESS: Primary | ICD-10-CM

## 2024-12-30 DIAGNOSIS — R59.0 CERVICAL LYMPHADENOPATHY: ICD-10-CM

## 2024-12-30 LAB
ANION GAP SERPL CALCULATED.3IONS-SCNC: 13 MMOL/L (ref 7–15)
BASOPHILS # BLD AUTO: 0.1 10E3/UL (ref 0–0.2)
BASOPHILS NFR BLD AUTO: 1 %
BUN SERPL-MCNC: 9.6 MG/DL (ref 6–20)
CALCIUM SERPL-MCNC: 9.6 MG/DL (ref 8.8–10.4)
CHLORIDE SERPL-SCNC: 104 MMOL/L (ref 98–107)
CREAT SERPL-MCNC: 1.07 MG/DL (ref 0.67–1.17)
CRP SERPL-MCNC: 10.46 MG/L
EGFRCR SERPLBLD CKD-EPI 2021: >90 ML/MIN/1.73M2
EOSINOPHIL # BLD AUTO: 0.3 10E3/UL (ref 0–0.7)
EOSINOPHIL NFR BLD AUTO: 4 %
ERYTHROCYTE [DISTWIDTH] IN BLOOD BY AUTOMATED COUNT: 11.7 % (ref 10–15)
GLUCOSE SERPL-MCNC: 107 MG/DL (ref 70–99)
HCO3 SERPL-SCNC: 24 MMOL/L (ref 22–29)
HCT VFR BLD AUTO: 46.9 % (ref 40–53)
HGB BLD-MCNC: 16.5 G/DL (ref 13.3–17.7)
IMM GRANULOCYTES # BLD: 0 10E3/UL
IMM GRANULOCYTES NFR BLD: 0 %
LACTATE SERPL-SCNC: 0.9 MMOL/L (ref 0.7–2)
LYMPHOCYTES # BLD AUTO: 3 10E3/UL (ref 0.8–5.3)
LYMPHOCYTES NFR BLD AUTO: 36 %
MCH RBC QN AUTO: 30.3 PG (ref 26.5–33)
MCHC RBC AUTO-ENTMCNC: 35.2 G/DL (ref 31.5–36.5)
MCV RBC AUTO: 86 FL (ref 78–100)
MONOCYTES # BLD AUTO: 0.8 10E3/UL (ref 0–1.3)
MONOCYTES NFR BLD AUTO: 10 %
NEUTROPHILS # BLD AUTO: 4 10E3/UL (ref 1.6–8.3)
NEUTROPHILS NFR BLD AUTO: 49 %
NRBC # BLD AUTO: 0 10E3/UL
NRBC BLD AUTO-RTO: 0 /100
PLATELET # BLD AUTO: 322 10E3/UL (ref 150–450)
POTASSIUM SERPL-SCNC: 3.9 MMOL/L (ref 3.4–5.3)
RBC # BLD AUTO: 5.44 10E6/UL (ref 4.4–5.9)
SODIUM SERPL-SCNC: 141 MMOL/L (ref 135–145)
WBC # BLD AUTO: 8.2 10E3/UL (ref 4–11)

## 2024-12-30 PROCEDURE — 80048 BASIC METABOLIC PNL TOTAL CA: CPT | Performed by: FAMILY MEDICINE

## 2024-12-30 PROCEDURE — 85004 AUTOMATED DIFF WBC COUNT: CPT | Performed by: FAMILY MEDICINE

## 2024-12-30 PROCEDURE — 41800 DRAINAGE OF GUM LESION: CPT

## 2024-12-30 PROCEDURE — 250N000011 HC RX IP 250 OP 636: Performed by: EMERGENCY MEDICINE

## 2024-12-30 PROCEDURE — 83605 ASSAY OF LACTIC ACID: CPT | Performed by: EMERGENCY MEDICINE

## 2024-12-30 PROCEDURE — 36415 COLL VENOUS BLD VENIPUNCTURE: CPT | Performed by: EMERGENCY MEDICINE

## 2024-12-30 PROCEDURE — 250N000009 HC RX 250: Performed by: EMERGENCY MEDICINE

## 2024-12-30 PROCEDURE — 85014 HEMATOCRIT: CPT | Performed by: FAMILY MEDICINE

## 2024-12-30 PROCEDURE — 96365 THER/PROPH/DIAG IV INF INIT: CPT | Mod: 59 | Performed by: EMERGENCY MEDICINE

## 2024-12-30 PROCEDURE — 99222 1ST HOSP IP/OBS MODERATE 55: CPT

## 2024-12-30 PROCEDURE — G0378 HOSPITAL OBSERVATION PER HR: HCPCS

## 2024-12-30 PROCEDURE — 96376 TX/PRO/DX INJ SAME DRUG ADON: CPT

## 2024-12-30 PROCEDURE — 70487 CT MAXILLOFACIAL W/DYE: CPT

## 2024-12-30 PROCEDURE — 99285 EMERGENCY DEPT VISIT HI MDM: CPT | Mod: 25 | Performed by: EMERGENCY MEDICINE

## 2024-12-30 PROCEDURE — 96366 THER/PROPH/DIAG IV INF ADDON: CPT | Mod: 59 | Performed by: EMERGENCY MEDICINE

## 2024-12-30 PROCEDURE — 99285 EMERGENCY DEPT VISIT HI MDM: CPT | Performed by: EMERGENCY MEDICINE

## 2024-12-30 PROCEDURE — 86140 C-REACTIVE PROTEIN: CPT | Performed by: FAMILY MEDICINE

## 2024-12-30 PROCEDURE — 87040 BLOOD CULTURE FOR BACTERIA: CPT | Performed by: EMERGENCY MEDICINE

## 2024-12-30 RX ORDER — ONDANSETRON 4 MG/1
4 TABLET, ORALLY DISINTEGRATING ORAL EVERY 6 HOURS PRN
Status: DISCONTINUED | OUTPATIENT
Start: 2024-12-30 | End: 2024-12-31 | Stop reason: HOSPADM

## 2024-12-30 RX ORDER — AMPICILLIN AND SULBACTAM 2; 1 G/1; G/1
3 INJECTION, POWDER, FOR SOLUTION INTRAMUSCULAR; INTRAVENOUS EVERY 6 HOURS
Status: DISCONTINUED | OUTPATIENT
Start: 2024-12-30 | End: 2024-12-31 | Stop reason: HOSPADM

## 2024-12-30 RX ORDER — ACETAMINOPHEN 325 MG/1
650 TABLET ORAL EVERY 4 HOURS PRN
Status: DISCONTINUED | OUTPATIENT
Start: 2024-12-30 | End: 2024-12-31 | Stop reason: HOSPADM

## 2024-12-30 RX ORDER — AMOXICILLIN 250 MG
1 CAPSULE ORAL 2 TIMES DAILY PRN
Status: DISCONTINUED | OUTPATIENT
Start: 2024-12-30 | End: 2024-12-31 | Stop reason: HOSPADM

## 2024-12-30 RX ORDER — AMOXICILLIN 250 MG
2 CAPSULE ORAL 2 TIMES DAILY PRN
Status: DISCONTINUED | OUTPATIENT
Start: 2024-12-30 | End: 2024-12-31 | Stop reason: HOSPADM

## 2024-12-30 RX ORDER — ONDANSETRON 2 MG/ML
4 INJECTION INTRAMUSCULAR; INTRAVENOUS EVERY 6 HOURS PRN
Status: DISCONTINUED | OUTPATIENT
Start: 2024-12-30 | End: 2024-12-31 | Stop reason: HOSPADM

## 2024-12-30 RX ORDER — IOPAMIDOL 755 MG/ML
67 INJECTION, SOLUTION INTRAVASCULAR ONCE
Status: COMPLETED | OUTPATIENT
Start: 2024-12-30 | End: 2024-12-30

## 2024-12-30 RX ADMIN — IOPAMIDOL 67 ML: 755 INJECTION, SOLUTION INTRAVENOUS at 15:34

## 2024-12-30 RX ADMIN — SODIUM CHLORIDE 80 ML: 9 INJECTION, SOLUTION INTRAVENOUS at 15:34

## 2024-12-30 RX ADMIN — AMPICILLIN SODIUM AND SULBACTAM SODIUM 3 G: 2; 1 INJECTION, POWDER, FOR SOLUTION INTRAMUSCULAR; INTRAVENOUS at 17:46

## 2024-12-30 RX ADMIN — AMPICILLIN SODIUM AND SULBACTAM SODIUM 3 G: 2; 1 INJECTION, POWDER, FOR SOLUTION INTRAMUSCULAR; INTRAVENOUS at 23:22

## 2024-12-30 ASSESSMENT — COLUMBIA-SUICIDE SEVERITY RATING SCALE - C-SSRS
6. HAVE YOU EVER DONE ANYTHING, STARTED TO DO ANYTHING, OR PREPARED TO DO ANYTHING TO END YOUR LIFE?: NO
2. HAVE YOU ACTUALLY HAD ANY THOUGHTS OF KILLING YOURSELF IN THE PAST MONTH?: NO
1. IN THE PAST MONTH, HAVE YOU WISHED YOU WERE DEAD OR WISHED YOU COULD GO TO SLEEP AND NOT WAKE UP?: NO

## 2024-12-30 ASSESSMENT — ACTIVITIES OF DAILY LIVING (ADL)
ADLS_ACUITY_SCORE: 15
TOILETING_ISSUES: NO
CHANGE_IN_FUNCTIONAL_STATUS_SINCE_ONSET_OF_CURRENT_ILLNESS/INJURY: NO
DOING_ERRANDS_INDEPENDENTLY_DIFFICULTY: NO
ADLS_ACUITY_SCORE: 41
ADLS_ACUITY_SCORE: 15
FALL_HISTORY_WITHIN_LAST_SIX_MONTHS: NO
ADLS_ACUITY_SCORE: 41
WEAR_GLASSES_OR_BLIND: NO
WALKING_OR_CLIMBING_STAIRS_DIFFICULTY: NO
ADLS_ACUITY_SCORE: 41
ADLS_ACUITY_SCORE: 41
DIFFICULTY_EATING/SWALLOWING: NO
DRESSING/BATHING_DIFFICULTY: NO
ADLS_ACUITY_SCORE: 41
ADLS_ACUITY_SCORE: 41
DIFFICULTY_COMMUNICATING: NO
CONCENTRATING,_REMEMBERING_OR_MAKING_DECISIONS_DIFFICULTY: NO
ADLS_ACUITY_SCORE: 41
HEARING_DIFFICULTY_OR_DEAF: NO

## 2024-12-30 NOTE — ED PROVIDER NOTES
[facility]  Emergency Department Visit Note    PATIENT:  Jaret Montoya     24 year old     male      [id number]    Chief complaint:  Chief Complaint   Patient presents with    Mouth Lesions     Pt states that he has a couple abscess in the mouth and the back of the tongue.        History of present illness:  Patient is a 24 year old male with a medical history significant for poor dentition, asthma, ADHD, allergies presenting for evaluation of draining pus in the front of his mouth as well as swelling at the back of his tongue and a different sensation with swallowing.  Patient reports that he went to see his dentist last week where he was started on Augmentin with concerns for a abscessed tooth.  He has been taking Augmentin however his symptoms have been worsening.  He notes that he is regularly having purulent drainage come from the top of his superior right first molar.  Was recommended to have this tooth removed sometime ago however he did not because of cost.  He denies any fevers and chills.  He notes that yesterday is when the sensation in his throat change specifically with swallowing.    Review of Systems:  As in HPI above    BP (!) 147/85   Pulse 89   Temp 99.4  F (37.4  C) (Tympanic)   Wt 87.5 kg (193 lb)   SpO2 99%   BMI 29.35 kg/m      Physical Exam  Constitutional: laying in hospital bed, alert, oriented, in no apparent distress, conversant, and answering questions appropriately  HEENT: normocephalic, atraumatic, pupils 3mm, equal, round, and reactive to light, sclerae anicteric, extraocular motions intact, moist mucous membranes, uvula midline, no tonsillar exudate or erythema, tongue not elevated, no sublingual firmness or tenderness, and poor dentition abscess over the right upper first molar that is draining purulence, surrounding erythema, there is mild erythema of the oropharynx but no purulence or other abscesses appreciated, induration over the right nasolabial fold  superior to the lip, actively draining abscess with manual pressure, no blood in drainage  Neck: able to fully range, no midline tenderness, and no submandibular swelling, there is right-sided cervical anterior lymphadenopathy  Cardiovascular: regular rate and rhythm  Pulmonary: breathing comfortably on room air and lungs clear to auscultation bilaterally  Abdominal: soft, non-tender, non-distended  Genitourinary: deferred  Extremities/MSK: no peripheral edema and no cyanosis   Skin: warm, dry and non-diaphoretic  Neurologic: moves all four extremities spontaneously and GCS 15  Psychiatric: calm, appropriate      MDM:  Patient is a 24 year old male with above history presenting for evaluation of draining abscess over .    Vitals notable for temperature of 99.4, SpO2 99, pulse 89, normal blood pressure. Exam is notable for an abscess over the right superior tooth, right-sided paracervical lymphadenopathy, no submandibular swelling, no oropharyngeal swelling or abscesses.    Considering patient has already been taking antibiotics, I am concerned about antibiotic failure.  I am also concerned that he possibly has an underlying osteomyelitis specifically in the setting of a persistent infection.  Plan for a CT of his face considering the abscess, basic labs CRP, lactic acid.      Remainder of ED course below.    ED COURSE:  ED Course as of 12/30/24 2054   Mon Dec 30, 2024   1712 CT Facial Bones with Contrast  IMPRESSION:  1. Prominent periapical lucency of the right maxillary first premolar,  concerning for a periapical abscess.  2. Crescentic rim-enhancing lesion along the anterolateral aspect of  the right maxillary alveolus adjacent to the aforementioned periapical  lucency, concerning for a subperiosteal abscess.  3. Mild asymmetric soft tissue swelling and subtle fat stranding  involving the right facial superficial soft tissues, concerning for  cellulitis. Please correlate clinically.  4. Prominent/mildly enlarged  nonspecific right-sided upper cervical  lymph nodes, which may be reactive.     1712 Concerning for osteomyelitis, discussed with our hospitalist team at [facility] and they recommend discussing with ENT, unasyn ordered.    2054 Patients labs largely reassuring, mild CRP elevation but no lactic acid elevation and no leukocytosis, unasyn given for concern of bone involvement. Signed out to Dr. Velasquez pending results of work up        Encounter Diagnoses:  Final diagnoses:   Dental infection       Final disposition: pending further workup, patient signed out to oncoming physician, Dr. Eva Duncan,    Physician  [facility]       Carli Duncan,   12/30/24 2054       Carli Duncan,   12/30/24 2055

## 2024-12-30 NOTE — ED TRIAGE NOTES
Pt states that he has a couple abscess in the mouth and the back of the tongue. Pt states that he has been on an antibiotic since Friday but it has gotten worse instead of getting better.

## 2024-12-30 NOTE — MEDICATION SCRIBE - ADMISSION MEDICATION HISTORY
Medication Scribe Admission Medication History    Admission medication history is complete. The information provided in this note is only as accurate as the sources available at the time of the update.    Information Source(s): Patient and CareEverywhere/SureScripts via with patient and finished at desk.    Pertinent Information: He started taking Augmentin on 12/27 and has had 5 doses so far.    Changes made to PTA medication list:  Added: Augmentin 875-125 mg  Deleted: None  Changed: None    Allergies reviewed with patient and updates made in EHR: yes, no change.    Medication History Completed By: Mary Kinney 12/30/2024 4:54 PM    PTA Med List   Medication Sig Last Dose/Taking    amoxicillin-clavulanate (AUGMENTIN) 875-125 MG tablet Take 1 tablet by mouth 2 times daily. For 7 days 12/30/2024 at  2:30 AM

## 2024-12-31 VITALS
SYSTOLIC BLOOD PRESSURE: 110 MMHG | OXYGEN SATURATION: 98 % | HEART RATE: 50 BPM | WEIGHT: 194.22 LBS | TEMPERATURE: 97.6 F | BODY MASS INDEX: 29.53 KG/M2 | DIASTOLIC BLOOD PRESSURE: 64 MMHG | RESPIRATION RATE: 16 BRPM

## 2024-12-31 LAB
CRP SERPL-MCNC: 7.92 MG/L
ERYTHROCYTE [DISTWIDTH] IN BLOOD BY AUTOMATED COUNT: 11.9 % (ref 10–15)
HCT VFR BLD AUTO: 42.8 % (ref 40–53)
HGB BLD-MCNC: 14.9 G/DL (ref 13.3–17.7)
MCH RBC QN AUTO: 30.4 PG (ref 26.5–33)
MCHC RBC AUTO-ENTMCNC: 34.8 G/DL (ref 31.5–36.5)
MCV RBC AUTO: 87 FL (ref 78–100)
PLATELET # BLD AUTO: 305 10E3/UL (ref 150–450)
RBC # BLD AUTO: 4.9 10E6/UL (ref 4.4–5.9)
WBC # BLD AUTO: 6 10E3/UL (ref 4–11)

## 2024-12-31 PROCEDURE — G0378 HOSPITAL OBSERVATION PER HR: HCPCS

## 2024-12-31 PROCEDURE — 99239 HOSP IP/OBS DSCHRG MGMT >30: CPT | Performed by: STUDENT IN AN ORGANIZED HEALTH CARE EDUCATION/TRAINING PROGRAM

## 2024-12-31 PROCEDURE — 96376 TX/PRO/DX INJ SAME DRUG ADON: CPT

## 2024-12-31 PROCEDURE — 250N000013 HC RX MED GY IP 250 OP 250 PS 637

## 2024-12-31 PROCEDURE — 85018 HEMOGLOBIN: CPT

## 2024-12-31 PROCEDURE — 250N000011 HC RX IP 250 OP 636

## 2024-12-31 PROCEDURE — 36415 COLL VENOUS BLD VENIPUNCTURE: CPT

## 2024-12-31 PROCEDURE — 250N000011 HC RX IP 250 OP 636: Performed by: EMERGENCY MEDICINE

## 2024-12-31 PROCEDURE — 86140 C-REACTIVE PROTEIN: CPT

## 2024-12-31 RX ORDER — CLINDAMYCIN HYDROCHLORIDE 300 MG/1
300 CAPSULE ORAL 4 TIMES DAILY
Qty: 28 CAPSULE | Refills: 0 | Status: SHIPPED | OUTPATIENT
Start: 2024-12-31

## 2024-12-31 RX ADMIN — ONDANSETRON 4 MG: 4 TABLET, ORALLY DISINTEGRATING ORAL at 04:56

## 2024-12-31 RX ADMIN — ACETAMINOPHEN 650 MG: 325 TABLET, FILM COATED ORAL at 04:56

## 2024-12-31 RX ADMIN — AMPICILLIN SODIUM AND SULBACTAM SODIUM 3 G: 2; 1 INJECTION, POWDER, FOR SOLUTION INTRAMUSCULAR; INTRAVENOUS at 10:11

## 2024-12-31 RX ADMIN — AMPICILLIN SODIUM AND SULBACTAM SODIUM 3 G: 2; 1 INJECTION, POWDER, FOR SOLUTION INTRAMUSCULAR; INTRAVENOUS at 04:56

## 2024-12-31 ASSESSMENT — ACTIVITIES OF DAILY LIVING (ADL)
ADLS_ACUITY_SCORE: 15

## 2024-12-31 NOTE — H&P
[facility]    History and Physical - Hospitalist Service       Date of Admission:  12/30/2024    Assessment & Plan    Jaret Montoya is a 24 year old male admitted on 12/30/2024. He presents for worsening oral abscess.    Periapical abscess    Seen by dentist 12/27 for pustular drainage from an oral abscess. Given Augmentin. Pustular drainage worsened. Hx of similar issue 7/2024 and he did not follow up with his dentist.     CT showed prominent periapical lucency of right maxillary first premoral concerning for periapical abscess. Concentric rim-enhancing lesion along anterolateral aspect of right maxillary alveolus adjacent to aforementioned periapical lucency, concerning for subperiosteal abscess. Abscess drained in ER.    Afebrile, not tachycardic, no leukocytosis. CRP 10. Case discussed with ENT who agreed with local admission and follow up with dentist for tooth extraction.     - Unasyn 3g IV Q6h, likely discharge on Clinda  - Follow up with dentist for extraction       Observation Goals: -diagnostic tests and consults completed and resulted, -vital signs normal or at patient baseline, -returns to baseline functional status, -safe disposition plan has been identified, Nurse to notify provider when observation goals have been met and patient is ready for discharge.  Diet: Regular Diet Adult  DVT Prophylaxis: Ambulate every shift  Thayer Catheter: Not present  Lines: None     Cardiac Monitoring: None  Code Status: Full Code    Clinically Significant Risk Factors Present on Admission                                 # Asthma: noted on problem list        Disposition Plan     Medically Ready for Discharge: Anticipated Tomorrow         The patient's care was discussed with the Attending Physician, Dr. Leon, Patient, and Patient's Family.    Ambrose Ingram PA-C  Hospitalist Service  [facility]  Securely message with Vocera (more info)  Text page via [employer]  Paging/Directory     ______________________________________________________________________    Chief Complaint   Dental pain    History is obtained from the patient, his mom    History of Present Illness   Jaret Montoya is a 24 year old male who presents for dental pain.    Patient presents for draining pus in the front of his mouth as well as swelling in the back of his tongue.  Yesterday he developed a different sensation when he was swallowing.  He went to his dentist for this last week where he was started on Augmentin for concerns of an abscess.  However, symptoms have worsened.  Currently he is regular having purulent drainage from the top of his superior right first molar.  He had something similar in July of this year and was treated with Augmentin and that did not work so he was switched to clinda which resolved.  He has not seen his dentist since then until last week.  He denies fevers, chills, palpitations.    Past Medical History    Past Medical History:   Diagnosis Date    ALLERGIC RHINITIS NOS 7/21/2007    ASTHMA - MILD INTERMITTENT 7/21/2007    ATTN DEFICIT W HYPERACT 6/21/2007    Head contusion 1/7/2009    Impacted cerumen 5/21/2014    Plantar warts 8/27/2009       Past Surgical History   History reviewed. No pertinent surgical history.    Prior to Admission Medications   Prior to Admission Medications   Prescriptions Last Dose Informant Patient Reported? Taking?   amoxicillin-clavulanate (AUGMENTIN) 875-125 MG tablet 12/30/2024 at  2:30 AM  Yes Yes   Sig: Take 1 tablet by mouth 2 times daily. For 7 days      Facility-Administered Medications: None      2023 UPDATE: these sections are not required for  billing, but can be added when medically relevant     Physical Exam   Vital Signs: Temp: 98  F (36.7  C) Temp src: Oral BP: 113/58 Pulse: 56   Resp: 16 SpO2: 99 % O2 Device: None (Room air)    Weight: 194 lbs 3.6 oz    Constitutional: Alert, oriented, cooperative, in no acute distress, appears  nontoxic.  HENT: Oropharynx is clear and moist. Bleeding lesion right anterior upper inner lip from drainage. No evidence of cranial trauma. Normocephalic. Eyes anicteric.   Cardiovascular: Regular rate and rhythm, normal S1 and S2, and no murmur noted.   Respiratory: Clear to auscultation bilaterally with no adventitious breath sounds.   GI: Soft, non-tender.  Musculoskeletal: Normal muscle bulk and tone. FROM in all extremities   Skin: Warm and dry, no rashes on exposed skin.   Psych: Normal affect and speech.  Neurologic: Cranial nerves 2-12 are grossly intact.       Medical Decision Making       65 MINUTES SPENT BY ME on the date of service doing chart review, history, exam, documentation & further activities per the note.      Data     I have personally reviewed the following data over the past 24 hrs:    8.2  \   16.5   / 322     141 104 9.6 /  107 (H)   3.9 24 1.07 \     Procal: N/A CRP: 10.46 (H) Lactic Acid: 0.9         Imaging results reviewed over the past 24 hrs:   Recent Results (from the past 24 hours)   CT Facial Bones with Contrast    Narrative    CT SCAN OF THE FACE WITH CONTRAST 12/30/2024 3:44 PM     HISTORY: Abscess over right jaw, failing antibiotics.    TECHNIQUE:  Axial scans of the face following intravenous contrast  with sagittal and coronal reformations. Radiation dose for this scan  was reduced using automated exposure control, adjustment of the mA  and/or kV according to patient size, or iterative reconstruction  technique. 67mL Isovue 370    COMPARISON: None.    FINDINGS: There is a prominent periapical lucency of the right  maxillary first premolar (series 2 image 60), with erosion of the  labial cortex of the right maxillary alveolus. Adjacent to the  periapical lucency, there is a crescentic centrally hypoattenuating,  peripherally hyperenhancing lesion overlying the outer aspect of the  right maxillary alveolus measuring approximately 14 mm x 9 mm x 10 mm  (series 3 image 29-30,  series 5 image 24), concerning for a  subperiosteal abscess. There is mild asymmetric soft tissue swelling  and fat stranding involving the right facial superficial soft tissues,  concerning for cellulitis.    The intracranial contents appear grossly unremarkable, accounting for  technique. Orbits appear normal. Probable retention cyst or polyp in  the posterior right ethmoid sinus. Minimal/mild scattered mucosal  thickening elsewhere, particularly in the maxillary sinuses.  Nonspecific enlarged right level Ib lymph node measuring 19 mm and  rounded right level IIa lymph node measuring 14 mm, which may be  reactive.      Impression    IMPRESSION:  1. Prominent periapical lucency of the right maxillary first premolar,  concerning for a periapical abscess.  2. Crescentic rim-enhancing lesion along the anterolateral aspect of  the right maxillary alveolus adjacent to the aforementioned periapical  lucency, concerning for a subperiosteal abscess.  3. Mild asymmetric soft tissue swelling and subtle fat stranding  involving the right facial superficial soft tissues, concerning for  cellulitis. Please correlate clinically.  4. Prominent/mildly enlarged nonspecific right-sided upper cervical  lymph nodes, which may be reactive.    SIOBHAN NOLAND MD         SYSTEM ID:  [id number]

## 2024-12-31 NOTE — CONSULTS
"ENT PROGRESS NOTE      Patient admitted for periapical abscess.  Per nursing note, pain is well controlled without medications.     Vital signs:  Temp: 97.6  F (36.4  C) Temp src: Oral BP: 110/64 Pulse: 50   Resp: 16 SpO2: 98 % O2 Device: None (Room air)     Weight: 88.1 kg (194 lb 3.6 oz)  Estimated body mass index is 29.53 kg/m  as calculated from the following:    Height as of 10/17/24: 1.727 m (5' 8\").    Weight as of this encounter: 88.1 kg (194 lb 3.6 oz).    ED NOTES:     Periapical abscess    Seen by dentist 12/27 for pustular drainage from an oral abscess. Given Augmentin. Pustular drainage worsened. Hx of similar issue 7/2024 and he did not follow up with his dentist.     CT showed prominent periapical lucency of right maxillary first premoral concerning for periapical abscess. Concentric rim-enhancing lesion along anterolateral aspect of right maxillary alveolus adjacent to aforementioned periapical lucency, concerning for subperiosteal abscess. Abscess drained in ER.    Afebrile, not tachycardic, no leukocytosis. CRP 10. Case discussed with ENT who agreed with local admission and follow up with dentist for tooth extraction.      - Unasyn 3g IV Q6h, likely discharge on Clinda  - Follow up with dentist for extraction      Agree with plan of care.   Would benefit from close follow up with dentistry or Oral Surgery.    Nik Mason MD    "

## 2024-12-31 NOTE — DISCHARGE SUMMARY
[facility]  Hospitalist Discharge Summary      Date of Admission:  12/30/2024  Date of Discharge:  12/31/2024  Discharging Provider: Akbar Leon MD  Discharge Service: Hospitalist Service    Discharge Diagnoses   # Periapical abscess    Clinically Significant Risk Factors          Follow-ups Needed After Discharge   Follow-up Appointments       Follow-up and recommended labs and tests       Follow up with dentistry within the next week.                Unresulted Labs Ordered in the Past 30 Days of this Admission       Date and Time Order Name Status Description    12/30/2024  4:29 PM Blood Culture Peripheral Blood Preliminary     12/30/2024  4:29 PM Blood Culture Peripheral Blood Preliminary         These results will be followed up by Akbar Leon MD.    Discharge Disposition   Discharged to home  Condition at discharge: Stable    Hospital Course    Jaret Montoya is a 24 year old male admitted on 12/30/2024. He presents for worsening oral abscess. No improved with drainage and IV antibiotics. Plan to discharge by mouth antibiotics and plan to follow up with dentistry      # Periapical abscess    Seen by dentist 12/27 for pustular drainage from an oral abscess. Given Augmentin. Pustular drainage worsened. Hx of similar issue 7/2024 and he did not follow up with his dentist.     CT showed prominent periapical lucency of right maxillary first premoral concerning for periapical abscess. Concentric rim-enhancing lesion along anterolateral aspect of right maxillary alveolus adjacent to aforementioned periapical lucency, concerning for subperiosteal abscess. Abscess drained in ER.    Afebrile, not tachycardic, no leukocytosis. CRP 10. Case discussed with ENT who agreed with local admission and follow up with dentist for tooth extraction.     Improved with antibiotics overnight. Discussed plan with patient who is scheduling with dentistry.   - Discharge on clindamycin   - Follow  up with dentist for extraction       Consultations This Hospital Stay   None    Code Status   Full Code    Time Spent on this Encounter   I, Akbar Leon MD, personally saw the patient today and spent greater than 30 minutes discharging this patient.       Akbar Leon MD  [facility] SURG[facility]  [address] [address]  Phone: [phone]  Fax: [phone]  ______________________________________________________________________    Physical Exam   Vital Signs: Temp: 97.6  F (36.4  C) Temp src: Oral BP: 110/64 Pulse: 50   Resp: 16 SpO2: 98 % O2 Device: None (Room air)    Weight: 194 lbs 3.6 oz  General Appearance: Awake and alert, sitting up in bed in no acute distress eating breakfast   Respiratory: Breathing easily on room air   Cardiovascular: Regular rate and rhythm, extremities warm and well perfused   GI: Abdomen soft, non-tender, and non-distended   Skin: No rashes, Per apical abscess right upper   Other: Appropriate mood and affect, no focal deficits appreciated         Primary Care Physician   Physician No Ref-Primary    Discharge Orders      Dental Referral      Reason for your hospital stay    You were hospitalized with a dental abscess that was treated with drainage and antibiotics. Your symptoms are improving and we will plan to have you discharge on by mouth antibiotics and follow up with dentistry     Follow-up and recommended labs and tests     Follow up with dentistry within the next week.     Activity    Your activity upon discharge: activity as tolerated     Diet    Follow this diet upon discharge: Current Diet:Orders Placed This Encounter      Regular Diet Adult       Significant Results and Procedures   Results for orders placed or performed during the hospital encounter of 12/30/24   CT Facial Bones with Contrast    Narrative    CT SCAN OF THE FACE WITH CONTRAST 12/30/2024 3:44 PM     HISTORY: Abscess over right jaw, failing  antibiotics.    TECHNIQUE:  Axial scans of the face following intravenous contrast  with sagittal and coronal reformations. Radiation dose for this scan  was reduced using automated exposure control, adjustment of the mA  and/or kV according to patient size, or iterative reconstruction  technique. 67mL Isovue 370    COMPARISON: None.    FINDINGS: There is a prominent periapical lucency of the right  maxillary first premolar (series 2 image 60), with erosion of the  labial cortex of the right maxillary alveolus. Adjacent to the  periapical lucency, there is a crescentic centrally hypoattenuating,  peripherally hyperenhancing lesion overlying the outer aspect of the  right maxillary alveolus measuring approximately 14 mm x 9 mm x 10 mm  (series 3 image 29-30, series 5 image 24), concerning for a  subperiosteal abscess. There is mild asymmetric soft tissue swelling  and fat stranding involving the right facial superficial soft tissues,  concerning for cellulitis.    The intracranial contents appear grossly unremarkable, accounting for  technique. Orbits appear normal. Probable retention cyst or polyp in  the posterior right ethmoid sinus. Minimal/mild scattered mucosal  thickening elsewhere, particularly in the maxillary sinuses.  Nonspecific enlarged right level Ib lymph node measuring 19 mm and  rounded right level IIa lymph node measuring 14 mm, which may be  reactive.      Impression    IMPRESSION:  1. Prominent periapical lucency of the right maxillary first premolar,  concerning for a periapical abscess.  2. Crescentic rim-enhancing lesion along the anterolateral aspect of  the right maxillary alveolus adjacent to the aforementioned periapical  lucency, concerning for a subperiosteal abscess.  3. Mild asymmetric soft tissue swelling and subtle fat stranding  involving the right facial superficial soft tissues, concerning for  cellulitis. Please correlate clinically.  4. Prominent/mildly enlarged nonspecific  right-sided upper cervical  lymph nodes, which may be reactive.    SIOBHAN NOLAND MD         SYSTEM ID:  [id number]       Discharge Medications   Current Discharge Medication List        START taking these medications    Details   clindamycin (CLEOCIN) 300 MG capsule Take 1 capsule (300 mg) by mouth 4 times daily.  Qty: 28 capsule, Refills: 0    Associated Diagnoses: Periapical abscess           STOP taking these medications       amoxicillin-clavulanate (AUGMENTIN) 875-125 MG tablet Comments:   Reason for Stopping:             Allergies   Allergies   Allergen Reactions    Azithromycin Rash     Zithromax    Dust Mites

## 2024-12-31 NOTE — PLAN OF CARE
[facility] DISCHARGE NOTE    Patient discharged to home at 10:59 AM via ambulation. Accompanied by mother and staff. Discharge instructions reviewed with patient, opportunity offered to ask questions. Prescriptions sent to patients preferred pharmacy. All belongings sent with patient.    Kei Moralez RNGoal Outcome Evaluation:      Plan of Care Reviewed With: patient

## 2024-12-31 NOTE — ED NOTES
[facility]   Admission Handoff    The patient is Jaret Montoya, 24 year old who arrived in the ED by CAR from home with a complaint of Mouth Lesions (Pt states that he has a couple abscess in the mouth and the back of the tongue.)  . The patient's current symptoms are new and during this time the symptoms have remained the same. In the ED, patient was diagnosed with   Final diagnoses:   Dental infection         Needed?: No    Allergies:    Allergies   Allergen Reactions    Azithromycin Rash     Zithromax    Dust Mites        Past Medical Hx:   Past Medical History:   Diagnosis Date    ALLERGIC RHINITIS NOS 7/21/2007    ASTHMA - MILD INTERMITTENT 7/21/2007    ATTN DEFICIT W HYPERACT 6/21/2007    Head contusion 1/7/2009    Impacted cerumen 5/21/2014    Plantar warts 8/27/2009       Initial vitals were: BP: (!) 147/85  Pulse: 89  Temp: 99.4  F (37.4  C)  Weight: 87.5 kg (193 lb)  SpO2: 99 %   Recent vital Signs: BP (!) 147/85   Pulse 89   Temp 99.4  F (37.4  C) (Tympanic)   Wt 87.5 kg (193 lb)   SpO2 99%   BMI 29.35 kg/m      Elimination Status: Continent: Yes     Activity Level: Independent    Fall Status: None  nonskid shoes/slippers when out of bed    Baseline Mental status: WDL  Current Mental Status changes: at basesline    Infection present or suspected this encounter: yes skin/wound/contact  Sepsis suspected: No    Isolation type:     Bariatric equipment needed?: No    In the ED these meds were given:   Medications   ampicillin-sulbactam (UNASYN) 3 g vial to attach to  mL bag (0 g Intravenous Stopped 12/30/24 2027)   iopamidol (ISOVUE-370) solution 67 mL (67 mLs Intravenous $Given 12/30/24 1534)   sodium chloride 0.9 % bag 500mL for CT scan flush use (80 mLs As instructed $Given 12/30/24 1534)       Drips running?  No    Home pump  No    Current LDAs: Peripheral IV: Site Left AC; Gauge 20  none     Results:   Labs/Imaging  Ordered and Resulted from Time of ED  Arrival Up to the Time of Departure from the ED  Results for orders placed or performed during the hospital encounter of 12/30/24 (from the past 24 hours)   CBC with platelets, differential    Narrative    The following orders were created for panel order CBC with platelets, differential.  Procedure                               Abnormality         Status                     ---------                               -----------         ------                     CBC with platelets and d...[[id number]]                      Final result                 Please view results for these tests on the individual orders.   Basic metabolic panel   Result Value Ref Range    Sodium 141 135 - 145 mmol/L    Potassium 3.9 3.4 - 5.3 mmol/L    Chloride 104 98 - 107 mmol/L    Carbon Dioxide (CO2) 24 22 - 29 mmol/L    Anion Gap 13 7 - 15 mmol/L    Urea Nitrogen 9.6 6.0 - 20.0 mg/dL    Creatinine 1.07 0.67 - 1.17 mg/dL    GFR Estimate >90 >60 mL/min/1.73m2    Calcium 9.6 8.8 - 10.4 mg/dL    Glucose 107 (H) 70 - 99 mg/dL   CRP Inflammation   Result Value Ref Range    CRP Inflammation 10.46 (H) <5.00 mg/L   Lactic acid whole blood with 1x repeat in 2 hr when >2   Result Value Ref Range    Lactic Acid, Initial 0.9 0.7 - 2.0 mmol/L   CBC with platelets and differential   Result Value Ref Range    WBC Count 8.2 4.0 - 11.0 10e3/uL    RBC Count 5.44 4.40 - 5.90 10e6/uL    Hemoglobin 16.5 13.3 - 17.7 g/dL    Hematocrit 46.9 40.0 - 53.0 %    MCV 86 78 - 100 fL    MCH 30.3 26.5 - 33.0 pg    MCHC 35.2 31.5 - 36.5 g/dL    RDW 11.7 10.0 - 15.0 %    Platelet Count 322 150 - 450 10e3/uL    % Neutrophils 49 %    % Lymphocytes 36 %    % Monocytes 10 %    % Eosinophils 4 %    % Basophils 1 %    % Immature Granulocytes 0 %    NRBCs per 100 WBC 0 <1 /100    Absolute Neutrophils 4.0 1.6 - 8.3 10e3/uL    Absolute Lymphocytes 3.0 0.8 - 5.3 10e3/uL    Absolute Monocytes 0.8 0.0 - 1.3 10e3/uL    Absolute Eosinophils 0.3 0.0 - 0.7 10e3/uL    Absolute Basophils 0.1  0.0 - 0.2 10e3/uL    Absolute Immature Granulocytes 0.0 <=0.4 10e3/uL    Absolute NRBCs 0.0 10e3/uL   CT Facial Bones with Contrast    Narrative    CT SCAN OF THE FACE WITH CONTRAST 12/30/2024 3:44 PM     HISTORY: Abscess over right jaw, failing antibiotics.    TECHNIQUE:  Axial scans of the face following intravenous contrast  with sagittal and coronal reformations. Radiation dose for this scan  was reduced using automated exposure control, adjustment of the mA  and/or kV according to patient size, or iterative reconstruction  technique. 67mL Isovue 370    COMPARISON: None.    FINDINGS: There is a prominent periapical lucency of the right  maxillary first premolar (series 2 image 60), with erosion of the  labial cortex of the right maxillary alveolus. Adjacent to the  periapical lucency, there is a crescentic centrally hypoattenuating,  peripherally hyperenhancing lesion overlying the outer aspect of the  right maxillary alveolus measuring approximately 14 mm x 9 mm x 10 mm  (series 3 image 29-30, series 5 image 24), concerning for a  subperiosteal abscess. There is mild asymmetric soft tissue swelling  and fat stranding involving the right facial superficial soft tissues,  concerning for cellulitis.    The intracranial contents appear grossly unremarkable, accounting for  technique. Orbits appear normal. Probable retention cyst or polyp in  the posterior right ethmoid sinus. Minimal/mild scattered mucosal  thickening elsewhere, particularly in the maxillary sinuses.  Nonspecific enlarged right level Ib lymph node measuring 19 mm and  rounded right level IIa lymph node measuring 14 mm, which may be  reactive.      Impression    IMPRESSION:  1. Prominent periapical lucency of the right maxillary first premolar,  concerning for a periapical abscess.  2. Crescentic rim-enhancing lesion along the anterolateral aspect of  the right maxillary alveolus adjacent to the aforementioned periapical  lucency, concerning for a  subperiosteal abscess.  3. Mild asymmetric soft tissue swelling and subtle fat stranding  involving the right facial superficial soft tissues, concerning for  cellulitis. Please correlate clinically.  4. Prominent/mildly enlarged nonspecific right-sided upper cervical  lymph nodes, which may be reactive.    SIOBHAN NOLAND MD         SYSTEM ID:  [id number]       For the majority of the shift this patient's behavior was Green     Cardiac Rhythm: N/A  Pt needs tele? No  Skin/wound Issues: None    Code Status: Full Code    Pain control: good    Nausea control: good    Abnormal labs/tests/findings requiring intervention:     Patient tested for COVID 19 prior to admission: NO     OBS brochure/video discussed/provided to patient/family: Yes    Family present during ED course? Yes     Family Comments/Social Situation comments: Mom at bedside    Tasks needing completion: None    Marlee Cuba RN

## 2024-12-31 NOTE — PROGRESS NOTES
[facility] [place]    Patient admitted to room 2405 at approximately 2300 via wheel chair from emergency room. Patient was accompanied by mother.     Verbal SBAR report received from Raine CAMACHO prior to patient arrival.     Patient ambulated to bed independently. Patient alert and oriented X 4. Pain is controlled without any medications.  . Admission vital signs: Blood pressure 134/69, pulse 56, temperature 98.7  F (37.1  C), temperature source Oral, resp. rate 16, weight 88.1 kg (194 lb 3.6 oz), SpO2 98%. mother was oriented to plan of care, call light, bed controls, tv, telephone, bathroom, and visiting hours.     Risk Assessment    The following safety risks were identified during admission: fall. Yellow risk band applied: YES.     Skin Initial Assessment    This writer admitted this patient and completed a full skin assessment and Tanvir score in the Adult PCS flowsheet.   Photo documentation of skin problem and/or wound competed via [employer] application (located under Media):  N/A  Patient declined skin check.   Appropriate interventions initiated as needed.         Tanvir Risk Assessment  Sensory Perception: 4-->no impairment  Moisture: 4-->rarely moist  Activity: 4-->walks frequently  Mobility: 3-->slightly limited  Nutrition: 3-->adequate  Friction and Shear: 3-->no apparent problem  Tanvir Score: 21  Mattress: Standard gel/foam mattress (IsoFlex, Atmos Air, etc.)  Bed Frame: Standard width and length    Education    Patient has a [place] to Observation order: Yes  Observation education completed and documented: Yes      Kristen Brush RN

## 2024-12-31 NOTE — ED PROVIDER NOTES
Emergency Department Sign-out Note    Time of sign-out: 1835    Patient summary:   24M, right upper first molar periapical abscess, now with infiltration into bone with lymphadenopathy, on amox-clav in outpatient setting for four days without improvement. Labs and vitals ok. Hospitalist requesting reaching out to ENT, awaiting hearing back.    Additional ED course:   1935  Spoke with aVdim with ENT.  He did not feel there would be any need for ENT operative intervention.  Ultimately agreed patient needs stabilization to be able to get to dentist for extraction.    2050  Head schedule patient regarding continuing outpatient antibiotics given now drainage of.  Focal abscess with purulent return.  We discussed that he needs to seek dental consultation for definitive management that we cannot provide here.  He is hesitant about going home due to concerns that the swelling could worsen overnight without observation and he is not particularly close to the hospital.  Ultimately spoke with hospitalist and planning for night of observation for IV antibiotics.    [facility]    PROCEDURE: -Incision/Drainage    Date/Time: 12/30/2024 8:51 PM    Performed by: Rony Velasquez MD  Authorized by: Rony Velasquez MD    Risks, benefits and alternatives discussed.      LOCATION:      Type:  Abscess    Location:  Mouth    Mouth location: periosteal.    PROCEDURE TYPE:     Complexity:  Simple    ANESTHESIA (see MAR for exact dosages):     Anesthesia method:  Local infiltration    Local anesthetic:  Bupivacaine 0.5% w/o epi    PROCEDURE DETAILS:     Needle aspiration: no      Incision types:  Single straight    Incision depth:  Submucosal    Scalpel blade:  11    Wound management:  Probed and deloculated    Drainage:  Bloody and purulent    Drainage amount: small (had been spontaneously draining in ED)    Wound treatment:  Wound left open    Packing materials:  None    PROCEDURE    Patient Tolerance:   Patient tolerated the procedure well with no immediate complications   Had episode of pre-syncope shortly after procedure while he was suctioning bloody and purulent secretions. No full syncope. Was laid flat with rapid improvement of symptoms that completely resolved within minutes.    Shantanu Velasquez MD  Staff Emergency Physician  [facility]       Rony Velasquez MD  12/30/24 2053

## 2025-01-02 LAB
BACTERIA BLD CULT: NORMAL
BACTERIA BLD CULT: NORMAL

## 2025-01-04 LAB
BACTERIA BLD CULT: NO GROWTH
BACTERIA BLD CULT: NO GROWTH